# Patient Record
(demographics unavailable — no encounter records)

---

## 2024-12-06 NOTE — PHYSICAL EXAM
[de-identified] : Tender over the lateral epicondyle.  Pain with resisted middle finger and wrist extension.  Biceps and triceps intact.  Nontender over radial tunnel [de-identified] : Imaging done today demonstrates moderate common extensor tendinitis with partial tearing

## 2024-12-06 NOTE — ASSESSMENT
[FreeTextEntry1] : Patient has failed conservative treatment of lateral epicondylitis.  Was scheduled for surgery with another doctor this past Monday but it got canceled.  Reasonable for him to pursue left elbow ECRB debridement  Risk benefits and alternatives discussed including, but limited to persistent symptoms loss of motion infection etc.

## 2024-12-06 NOTE — HISTORY OF PRESENT ILLNESS
[Right] : right hand dominant [FreeTextEntry1] : Patient presents with bilateral elbow pain, left lateral elbow pain and right medial elbow pain. He is more concerned about the left elbow. Patient was doing O/T with some relief. He has had 2 steroid injections on the lateral side of the elbow by Dr. Pedraza in [place] and 1 steroid injection by another doctor a few years before that.  Patient was scheduled for left elbow surgery by Dr. Mendez on 11/11/2024 which was canceled.   Patient had an MRI of the left elbow on 8/12/2024, impressions showed, moderate common extensor tendinosis with moderate grade partial tearing.  Minimal common flexor tendinosis with low-grade partial tearing at its origin. Patient has tried wearing counterforce brace on the left elbow with no relief. Patient is refusing Xrays today because of too much radiation.

## 2025-01-27 NOTE — HISTORY OF PRESENT ILLNESS
[___ Days Post Op] : post op day #[unfilled] [de-identified] : DOS: 1/14/25 [de-identified] : 13 days status post left elbow ECRB debridement [de-identified] : Incision line is clean, dry, and intact.  Negative Tinel's.  No evidence of infection.  Patient moving all digits well.  Patient's neuro and vascular grossly intact. [de-identified] : 13 days status post left elbow ECRB debridement [de-identified] : Sutures were removed Home program outlined to reduce the risk of complications Referral to outside occupational therapy Return to the office in 6 weeks

## 2025-01-27 NOTE — HISTORY OF PRESENT ILLNESS
[___ Days Post Op] : post op day #[unfilled] [de-identified] : DOS: 1/14/25 [de-identified] : 13 days status post left elbow ECRB debridement [de-identified] : Incision line is clean, dry, and intact.  Negative Tinel's.  No evidence of infection.  Patient moving all digits well.  Patient's neuro and vascular grossly intact. [de-identified] : 13 days status post left elbow ECRB debridement [de-identified] : Sutures were removed Home program outlined to reduce the risk of complications Referral to outside occupational therapy Return to the office in 6 weeks

## 2025-03-10 NOTE — HISTORY OF PRESENT ILLNESS
[de-identified] : DOS: 1/14/25. [de-identified] : 7 weeks 6 days status post left elbow ECRB debridement. He goes for therapy twice a week He reports that that symptoms have improved since the last time. 10/10 preop when bad, now 4/10 [de-identified] : Incision healing nicely.  Full elbow range of motion.  Slight tenderness to the lateral epicondyle [de-identified] : 7 weeks 6 days status post left elbow ECRB debridement. [de-identified] : Patient doing well.  Will advance therapy would like to see back in 6 weeks

## 2025-03-10 NOTE — HISTORY OF PRESENT ILLNESS
[de-identified] : DOS: 1/14/25. [de-identified] : 7 weeks 6 days status post left elbow ECRB debridement. He goes for therapy twice a week He reports that that symptoms have improved since the last time. 10/10 preop when bad, now 4/10 [de-identified] : Incision healing nicely.  Full elbow range of motion.  Slight tenderness to the lateral epicondyle [de-identified] : 7 weeks 6 days status post left elbow ECRB debridement. [de-identified] : Patient doing well.  Will advance therapy would like to see back in 6 weeks

## 2025-03-18 NOTE — HISTORY OF PRESENT ILLNESS
[FreeTextEntry1] : Patient presents today because of pain at the outside of his right ankle but also at the 5th met. base and towards the right 5th toe. 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

## 2025-03-18 NOTE — ASSESSMENT
[FreeTextEntry1] : Impression: Peroneal tendonitis. Ankle pain.  Treatment: I gave him some further strengthening and home therapy exercises to work on that I think will be beneficial. I want to get an MRI on the right side without contrast.

## 2025-04-21 NOTE — HISTORY OF PRESENT ILLNESS
[FreeTextEntry1] : DOS 1/14/25  3 months 1 week s/p left elbow ECRB debridement. Pt states after advancing therapy he is feeling significantly better.   Pre op pain 10/10 and now 2/3

## 2025-04-21 NOTE — PHYSICAL EXAM
[de-identified] : Full elbow range of motion.  No pain with resisted middle finger or wrist extension.  Very slight tenderness at lateral epicondyle.  Incision healing nicely

## 2025-04-21 NOTE — PHYSICAL EXAM
[de-identified] : Full elbow range of motion.  No pain with resisted middle finger or wrist extension.  Very slight tenderness at lateral epicondyle.  Incision healing nicely

## 2025-04-21 NOTE — ASSESSMENT
[FreeTextEntry1] : Patient dramatically improved.  He will continue with therapy and work on, extensor stretching and strengthening and I will make a follow-up appointment in 6 to 8 weeks however if doing very well he can always cancel

## 2025-04-21 NOTE — PHYSICAL EXAM
[de-identified] : Full elbow range of motion.  No pain with resisted middle finger or wrist extension.  Very slight tenderness at lateral epicondyle.  Incision healing nicely